# Patient Record
Sex: MALE | Race: BLACK OR AFRICAN AMERICAN | NOT HISPANIC OR LATINO | URBAN - METROPOLITAN AREA
[De-identification: names, ages, dates, MRNs, and addresses within clinical notes are randomized per-mention and may not be internally consistent; named-entity substitution may affect disease eponyms.]

---

## 2020-08-13 ENCOUNTER — TELEPHONE ENCOUNTER (OUTPATIENT)
Dept: URBAN - METROPOLITAN AREA CLINIC 92 | Facility: CLINIC | Age: 50
End: 2020-08-13

## 2020-08-31 ENCOUNTER — OFFICE VISIT (OUTPATIENT)
Dept: URBAN - METROPOLITAN AREA CLINIC 84 | Facility: CLINIC | Age: 50
End: 2020-08-31
Payer: COMMERCIAL

## 2020-08-31 DIAGNOSIS — R63.4 WEIGHT LOSS: ICD-10-CM

## 2020-08-31 DIAGNOSIS — K70.9 ALCOHOLIC LIVER DISEASE: ICD-10-CM

## 2020-08-31 PROCEDURE — G8427 DOCREV CUR MEDS BY ELIG CLIN: HCPCS | Performed by: INTERNAL MEDICINE

## 2020-08-31 PROCEDURE — 3017F COLORECTAL CA SCREEN DOC REV: CPT | Performed by: INTERNAL MEDICINE

## 2020-08-31 PROCEDURE — 1036F TOBACCO NON-USER: CPT | Performed by: INTERNAL MEDICINE

## 2020-08-31 PROCEDURE — 99204 OFFICE O/P NEW MOD 45 MIN: CPT | Performed by: INTERNAL MEDICINE

## 2020-08-31 PROCEDURE — G8420 CALC BMI NORM PARAMETERS: HCPCS | Performed by: INTERNAL MEDICINE

## 2020-08-31 RX ORDER — FOLIC ACID 1 MG/1
1 TABLET TABLET ORAL ONCE A DAY
Qty: 30 TABLET | Refills: 6 | OUTPATIENT

## 2020-08-31 NOTE — HPI-TODAY'S VISIT:
Patient with alcoholic liver disease present for evaluation. Review of labs showed prior Hep B vaccination and no exposure to hepC, normal platelets, hepatomegaly with small ascites on doppler u/s, low albumin and elevated AST>ALT. Patient has lost 25lbs over 6 months with decreased appetite. No prior hx of colonoscopy.

## 2020-09-18 ENCOUNTER — OFFICE VISIT (OUTPATIENT)
Dept: URBAN - METROPOLITAN AREA SURGERY CENTER 20 | Facility: SURGERY CENTER | Age: 50
End: 2020-09-18

## 2020-09-25 ENCOUNTER — OFFICE VISIT (OUTPATIENT)
Dept: URBAN - METROPOLITAN AREA SURGERY CENTER 20 | Facility: SURGERY CENTER | Age: 50
End: 2020-09-25
Payer: COMMERCIAL

## 2020-09-25 ENCOUNTER — CLAIMS CREATED FROM THE CLAIM WINDOW (OUTPATIENT)
Dept: URBAN - METROPOLITAN AREA CLINIC 4 | Facility: CLINIC | Age: 50
End: 2020-09-25
Payer: COMMERCIAL

## 2020-09-25 DIAGNOSIS — K29.60 OTHER GASTRITIS WITHOUT BLEEDING: ICD-10-CM

## 2020-09-25 DIAGNOSIS — I86.4 GASTRIC VARICES: ICD-10-CM

## 2020-09-25 DIAGNOSIS — I85.10 ESOPH VARICE OTHER DIS: ICD-10-CM

## 2020-09-25 DIAGNOSIS — D12.3 BENIGN NEOPLASM OF TRANSVERSE COLON: ICD-10-CM

## 2020-09-25 DIAGNOSIS — D50.9 ANEMIA, IRON DEFICIENCY: ICD-10-CM

## 2020-09-25 DIAGNOSIS — K31.89 OTHER DISEASES OF STOMACH AND DUODENUM: ICD-10-CM

## 2020-09-25 DIAGNOSIS — D12.3 ADENOMA OF TRANSVERSE COLON: ICD-10-CM

## 2020-09-25 DIAGNOSIS — K70.9 ALCOHOLIC LIVER DISEASE: ICD-10-CM

## 2020-09-25 DIAGNOSIS — D12.5 BENIGN NEOPLASM OF SIGMOID COLON: ICD-10-CM

## 2020-09-25 DIAGNOSIS — K31.89 ACQUIRED DEFORMITY OF DUODENUM: ICD-10-CM

## 2020-09-25 DIAGNOSIS — D12.4 ADENOMA OF DESCENDING COLON: ICD-10-CM

## 2020-09-25 PROCEDURE — 43239 EGD BIOPSY SINGLE/MULTIPLE: CPT | Performed by: INTERNAL MEDICINE

## 2020-09-25 PROCEDURE — 45385 COLONOSCOPY W/LESION REMOVAL: CPT | Performed by: INTERNAL MEDICINE

## 2020-09-25 PROCEDURE — 88312 SPECIAL STAINS GROUP 1: CPT | Performed by: PATHOLOGY

## 2020-09-25 PROCEDURE — 45381 COLONOSCOPY SUBMUCOUS NJX: CPT | Performed by: INTERNAL MEDICINE

## 2020-09-25 PROCEDURE — G8907 PT DOC NO EVENTS ON DISCHARG: HCPCS | Performed by: INTERNAL MEDICINE

## 2020-09-25 PROCEDURE — G9937 DIG OR SURV COLSCO: HCPCS | Performed by: INTERNAL MEDICINE

## 2020-09-25 PROCEDURE — 88305 TISSUE EXAM BY PATHOLOGIST: CPT | Performed by: PATHOLOGY

## 2020-09-28 ENCOUNTER — TELEPHONE ENCOUNTER (OUTPATIENT)
Dept: URBAN - METROPOLITAN AREA CLINIC 92 | Facility: CLINIC | Age: 50
End: 2020-09-28

## 2020-09-29 ENCOUNTER — TELEPHONE ENCOUNTER (OUTPATIENT)
Dept: URBAN - METROPOLITAN AREA CLINIC 84 | Facility: CLINIC | Age: 50
End: 2020-09-29

## 2020-09-30 ENCOUNTER — TELEPHONE ENCOUNTER (OUTPATIENT)
Dept: URBAN - METROPOLITAN AREA CLINIC 84 | Facility: CLINIC | Age: 50
End: 2020-09-30

## 2020-10-02 ENCOUNTER — OFFICE VISIT (OUTPATIENT)
Dept: URBAN - METROPOLITAN AREA SURGERY CENTER 20 | Facility: SURGERY CENTER | Age: 50
End: 2020-10-02

## 2020-11-02 ENCOUNTER — OFFICE VISIT (OUTPATIENT)
Dept: URBAN - METROPOLITAN AREA CLINIC 84 | Facility: CLINIC | Age: 50
End: 2020-11-02
Payer: COMMERCIAL

## 2020-11-02 ENCOUNTER — WEB ENCOUNTER (OUTPATIENT)
Dept: URBAN - METROPOLITAN AREA CLINIC 84 | Facility: CLINIC | Age: 50
End: 2020-11-02

## 2020-11-02 DIAGNOSIS — K70.9 LIVER DISEASE DUE TO ALCOHOL: ICD-10-CM

## 2020-11-02 DIAGNOSIS — R18.8 ASCITES: ICD-10-CM

## 2020-11-02 DIAGNOSIS — K63.5 COLON POLYP: ICD-10-CM

## 2020-11-02 PROCEDURE — 93976 VASCULAR STUDY: CPT | Performed by: INTERNAL MEDICINE

## 2020-11-02 PROCEDURE — 99214 OFFICE O/P EST MOD 30 MIN: CPT | Performed by: INTERNAL MEDICINE

## 2020-11-02 PROCEDURE — 83520 IMMUNOASSAY QUANT NOS NONAB: CPT | Performed by: INTERNAL MEDICINE

## 2020-11-02 PROCEDURE — 82977 ASSAY OF GGT: CPT | Performed by: INTERNAL MEDICINE

## 2020-11-02 PROCEDURE — 82247 BILIRUBIN TOTAL: CPT | Performed by: INTERNAL MEDICINE

## 2020-11-02 PROCEDURE — G8427 DOCREV CUR MEDS BY ELIG CLIN: HCPCS | Performed by: INTERNAL MEDICINE

## 2020-11-02 PROCEDURE — 1036F TOBACCO NON-USER: CPT | Performed by: INTERNAL MEDICINE

## 2020-11-02 PROCEDURE — 3017F COLORECTAL CA SCREEN DOC REV: CPT | Performed by: INTERNAL MEDICINE

## 2020-11-02 PROCEDURE — 83883 ASSAY NEPHELOMETRY NOT SPEC: CPT | Performed by: INTERNAL MEDICINE

## 2020-11-02 PROCEDURE — G8420 CALC BMI NORM PARAMETERS: HCPCS | Performed by: INTERNAL MEDICINE

## 2020-11-02 RX ORDER — FOLIC ACID 1 MG/1
1 TABLET TABLET ORAL ONCE A DAY
Qty: 30 TABLET | Refills: 6 | Status: ACTIVE | COMMUNITY

## 2020-11-02 NOTE — HPI-TODAY'S VISIT:
Patient present for f/u s/p colonoscopy with polypectomy of 2 large polyps with subsequent bleeding. Patient admitted and bleeding controlled. He currenly has no complaints and no evidence of further bleeding after discharge.

## 2020-11-05 PROBLEM — 389026000 ASCITES: Status: ACTIVE | Noted: 2020-11-02

## 2020-11-05 PROBLEM — 41309000 ALCOHOLIC LIVER DAMAGE: Status: ACTIVE | Noted: 2020-11-02

## 2020-11-16 ENCOUNTER — OFFICE VISIT (OUTPATIENT)
Dept: URBAN - METROPOLITAN AREA CLINIC 83 | Facility: CLINIC | Age: 50
End: 2020-11-16
Payer: COMMERCIAL

## 2020-11-16 DIAGNOSIS — K76.0 HEPATIC STEATOSIS: ICD-10-CM

## 2020-11-16 DIAGNOSIS — K76.89 ABNORMAL LIVER FUNCTION: ICD-10-CM

## 2020-11-16 DIAGNOSIS — K80.20 CHOLELITHIASIS: ICD-10-CM

## 2020-11-16 DIAGNOSIS — R16.1 SPLENOMEGALY: ICD-10-CM

## 2020-11-16 PROCEDURE — 93975 VASCULAR STUDY: CPT | Performed by: INTERNAL MEDICINE

## 2020-11-16 PROCEDURE — 76700 US EXAM ABDOM COMPLETE: CPT | Performed by: INTERNAL MEDICINE

## 2020-11-16 RX ORDER — FOLIC ACID 1 MG/1
1 TABLET TABLET ORAL ONCE A DAY
Qty: 30 TABLET | Refills: 6 | Status: ACTIVE | COMMUNITY

## 2020-12-01 ENCOUNTER — TELEPHONE ENCOUNTER (OUTPATIENT)
Dept: URBAN - METROPOLITAN AREA CLINIC 84 | Facility: CLINIC | Age: 50
End: 2020-12-01

## 2020-12-02 ENCOUNTER — TELEPHONE ENCOUNTER (OUTPATIENT)
Dept: URBAN - METROPOLITAN AREA CLINIC 92 | Facility: CLINIC | Age: 50
End: 2020-12-02

## 2020-12-02 RX ORDER — FUROSEMIDE 40 MG/1
1 TABLET TABLET ORAL ONCE A DAY
Qty: 30 TABLET | Refills: 2 | OUTPATIENT

## 2020-12-02 RX ORDER — SPIRONOLACTONE 50 MG/1
1 TABLET TABLET, FILM COATED ORAL ONCE A DAY
Qty: 30 TABLET | Refills: 2 | OUTPATIENT

## 2021-02-07 PROBLEM — 9953008 ACUTE ALCOHOLIC LIVER DISEASE: Status: ACTIVE | Noted: 2021-02-07

## 2021-02-07 PROBLEM — 161646004 H/O: HIGH RISK MEDICATION: Status: ACTIVE | Noted: 2021-02-07

## 2021-02-07 PROBLEM — 443913008: Status: ACTIVE | Noted: 2021-02-07

## 2021-02-07 PROBLEM — 18165001 JAUNDICE: Status: ACTIVE | Noted: 2021-02-07

## 2021-02-07 PROBLEM — 428283002: Status: ACTIVE | Noted: 2021-02-07

## 2021-02-07 PROBLEM — 420054005 ALCOHOLIC CIRRHOSIS: Status: ACTIVE | Noted: 2021-02-07

## 2021-02-07 PROBLEM — 275551007 HISTORY OF GASTROINTESTINAL BLEED: Status: ACTIVE | Noted: 2021-02-07

## 2021-02-07 PROBLEM — 851000119109 HISTORY OF COLONOSCOPY: Status: ACTIVE | Noted: 2021-02-07

## 2021-02-15 ENCOUNTER — DASHBOARD ENCOUNTERS (OUTPATIENT)
Age: 51
End: 2021-02-15

## 2021-02-17 ENCOUNTER — OFFICE VISIT (OUTPATIENT)
Dept: URBAN - METROPOLITAN AREA TELEHEALTH 2 | Facility: TELEHEALTH | Age: 51
End: 2021-02-17

## 2021-02-17 RX ORDER — SPIRONOLACTONE 50 MG/1
1 TABLET TABLET, FILM COATED ORAL ONCE A DAY
Qty: 30 TABLET | Refills: 2 | COMMUNITY

## 2021-02-17 RX ORDER — FOLIC ACID 1 MG/1
1 TABLET TABLET ORAL ONCE A DAY
Qty: 30 TABLET | Refills: 6 | COMMUNITY

## 2021-02-17 RX ORDER — FUROSEMIDE 40 MG/1
1 TABLET TABLET ORAL ONCE A DAY
Qty: 30 TABLET | Refills: 2 | COMMUNITY

## 2021-02-17 NOTE — HPI-OTHER HISTORIES
Patient is a 50-year-old male that we are seeing today for the first time.  We saw notes that the patient saw previously Dr. Nghia Villatoro on November 2, 2020.  Patient had a colonoscopy with polypectomy of 2 large polyps with subsequent bleeding.  Patient admitted and had bleeding controlled.  Noted to have BMI 22.16 at the time.  We saw local laboratories from November 20, 2020 that showed glucose of 206 elevated BUN of 7 creatinine 0.79 sodium 137 potassium 4.1 chloride 105 CO2 25 calcium 8.9 albumin 3.0 total bilirubin elevated 3.3 alkaline phosphatase 78 AST 42 ALT 13  November 16, 2020 ultrasound showed the liver to be increased echogenicity suggesting fatty infiltration with no discrete lesion.  Gallstones were seen as well as sludge but gallbladder wall within normal limits common bile duct was 6.3 mm pancreas was normal.  Right kidney 11.3 cm no hydronephrosis.  Spleen 15.1 cm several vessels patent.  Trace ascites was seen.  October 2020 labs were sent 10 showing white count 6.1 hemoglobin 11.4 plate count 215 prior labs from August 13,020 showed white blood cell count 14.7 hemoglobin 8.9 MCV 93.8 and plate count then was 215 is on laboratories dating back to August 2020 through October 2020 which showed that the albumin trend had been dropping from 2.8-2.6 total bilirubin had dropped from 10.2 back in August down to 5.0 by October.  Alkaline phosphatase had gone from 1 19-74 AST from 2 16-56 ALT from 65-14 so dramatic improvement of labs clearly noted.  Saw note from [facility] regarding the patient having post polypectomy bleeding that was noted at the polypectomy site 6 mm of epinephrine was injected and 5 hemoclips were placed and then a gold probe coagulation also was used.  This was back appears to be in September 29, 2016.  CT scan done at [facility] also showed small volume of perihepatic and perisplenic fluid small focus of interloop free fluid.  Typhlitis pattern with marrow edema thumbprinting of the cecum taper marrow edema throughout the ascending colon small amount of free fluid seen.  Mildly distended gallbladder with multiple calcified stones, by duct 8 mm.  Mild spondylosis was noted.  Appears that was the main focus of the report.  August 3, 2020 through August 6, 2020 discharge summary from [facility] was noted where they listed the patient pneumonia both lower lobes that was seen.  He had a complaint of 5 days of cough.  Diagnosed with liver disease from alcohol about a month prior to that time and referred to see GI.  Was to see GI today but was ill and came in.  COVID-19 test was negative.  Ultrasound under admission showed hepatomegaly history pancreas visualized portions within normal limits vessels were patent liver.  Fatty 24 cm.  No focal lesions.  Some hyper echogenic intraluminal gallbladder foci noted.  Gallbladder wall was thickened measuring 7 mm.  No [place] sign was seen.  Mildly dilated common bile duct 10 mm no hydronephrosis was seen.  CT of the chest showed no gross evidence of pulmonary emboli but did show findings suggestive of pneumonia with parapneumonic effusion.  Echocardiogram done and was normal.  No DVT was seen also on ultrasound of the legs.  Patient at that time was started on Lasix 40 mg a day levofloxacin for 4-day course Xifaxan 200 mg 3 times a day Aldactone 50 mg a day and Tessalon 100 mg 3 times a day.  Patient coming in to be seen.  Plan:  1.  Needs updated labs and meld score to see how he is doing.  5 prior check appear to be doing better.  2.  Needs updated ultrasound with Doppler to look for fluid See how much remains at this point.  3.  Reassess post above.